# Patient Record
Sex: MALE | URBAN - METROPOLITAN AREA
[De-identification: names, ages, dates, MRNs, and addresses within clinical notes are randomized per-mention and may not be internally consistent; named-entity substitution may affect disease eponyms.]

---

## 2023-11-07 ENCOUNTER — OFFICE (OUTPATIENT)
Dept: URBAN - METROPOLITAN AREA CLINIC 26 | Facility: CLINIC | Age: 43
End: 2023-11-07

## 2023-11-07 VITALS
SYSTOLIC BLOOD PRESSURE: 144 MMHG | HEIGHT: 70 IN | TEMPERATURE: 98.6 F | WEIGHT: 175 LBS | HEART RATE: 70 BPM | DIASTOLIC BLOOD PRESSURE: 100 MMHG

## 2023-11-07 DIAGNOSIS — R19.4 CHANGE IN BOWEL HABIT: ICD-10-CM

## 2023-11-07 DIAGNOSIS — R13.10 DYSPHAGIA, UNSPECIFIED: ICD-10-CM

## 2023-11-07 DIAGNOSIS — Z80.0 FAMILY HISTORY OF MALIGNANT NEOPLASM OF DIGESTIVE ORGANS: ICD-10-CM

## 2023-11-07 DIAGNOSIS — K21.9 GASTRO-ESOPHAGEAL REFLUX DISEASE WITHOUT ESOPHAGITIS: ICD-10-CM

## 2023-11-07 DIAGNOSIS — R63.4 ABNORMAL WEIGHT LOSS: ICD-10-CM

## 2023-11-07 DIAGNOSIS — K62.5 HEMORRHAGE OF ANUS AND RECTUM: ICD-10-CM

## 2023-11-07 PROCEDURE — 99204 OFFICE O/P NEW MOD 45 MIN: CPT | Performed by: INTERNAL MEDICINE

## 2023-11-07 RX ORDER — PANTOPRAZOLE 20 MG/1
20 TABLET, DELAYED RELEASE ORAL
Qty: 90 | Refills: 0 | Status: ACTIVE
Start: 2023-11-07

## 2023-12-03 ENCOUNTER — ANCILLARY PROCEDURE (OUTPATIENT)
Dept: RADIOLOGY | Facility: CLINIC | Age: 43
End: 2023-12-03
Payer: COMMERCIAL

## 2023-12-03 DIAGNOSIS — S69.91XA HAND INJURY, RIGHT, INITIAL ENCOUNTER: ICD-10-CM

## 2023-12-03 PROCEDURE — 73130 X-RAY EXAM OF HAND: CPT | Mod: RIGHT SIDE | Performed by: RADIOLOGY

## 2023-12-03 PROCEDURE — 73130 X-RAY EXAM OF HAND: CPT | Mod: RT,FY,FR

## 2023-12-06 ENCOUNTER — OFFICE VISIT (OUTPATIENT)
Dept: ORTHOPEDIC SURGERY | Facility: CLINIC | Age: 43
End: 2023-12-06
Payer: COMMERCIAL

## 2023-12-06 VITALS — HEIGHT: 70 IN | BODY MASS INDEX: 25.2 KG/M2 | WEIGHT: 176 LBS

## 2023-12-06 DIAGNOSIS — S62.326A CLOSED DISPLACED FRACTURE OF SHAFT OF FIFTH METACARPAL BONE OF RIGHT HAND, INITIAL ENCOUNTER: Primary | ICD-10-CM

## 2023-12-06 PROCEDURE — 99203 OFFICE O/P NEW LOW 30 MIN: CPT | Performed by: ORTHOPAEDIC SURGERY

## 2023-12-06 RX ORDER — SODIUM CHLORIDE, SODIUM LACTATE, POTASSIUM CHLORIDE, CALCIUM CHLORIDE 600; 310; 30; 20 MG/100ML; MG/100ML; MG/100ML; MG/100ML
100 INJECTION, SOLUTION INTRAVENOUS CONTINUOUS
Status: CANCELLED | OUTPATIENT
Start: 2023-12-06

## 2023-12-06 RX ORDER — BUPIVACAINE HYDROCHLORIDE 5 MG/ML
10 INJECTION, SOLUTION EPIDURAL; INTRACAUDAL ONCE
Status: CANCELLED | OUTPATIENT
Start: 2023-12-06 | End: 2023-12-06

## 2023-12-06 NOTE — PROGRESS NOTES
Subjective    Patient ID: Waqas Steele is a 43 y.o. male.    Chief Complaint: OTHER (RT HAND PAIN SINCE 12/2/23./PATIENT PUNCHED A TREE.)     Last Surgery: No surgery found  Last Surgery Date: No surgery found    HPI  Patient is a 43-year-old right-hand-dominant male who comes in after sustaining an injury to his right hand 5 days ago.  The injury.  He punched a tree.  He had immediate pain and swelling.  He was seen at urgent care when he was diagnosed with 1/5 metacarpal shaft fracture.  It was displaced.  He was splinted and comes in today for first follow-up.  He denies any other trauma.  He denies any other numbness and paresthesias.    Objective   Ortho Exam  Patient is in no acute distress.  Exam of his right hand reveals the skin envelope is intact.  He is tender to palpation over the fifth metacarpal shaft.  He has function of his EDC, EDQ, FDS and FDP in his right little finger.  However range of motion is limited secondary to pain and deformity.    Image Results:  XR hand right 3+ views  Narrative: STUDY:  Hand Radiographs; 12/3/2023 4:37 PM  INDICATION:  Right hand injury.  COMPARISON:  None Available.  ACCESSION NUMBER(S):  [id number]  ORDERING CLINICIAN:  Ward Breen  TECHNIQUE:  Three view(s) of the right hand.  FINDINGS:    Mildly displaced angulated midshaft fifth metacarpal fracture with  apex dorsal angulation  Impression: Mildly displaced angulated midshaft fifth metacarpal fracture with  apex dorsal angulation.  Signed by David Alvarado MD      Assessment/Plan   Encounter Diagnoses:  Closed displaced fracture of shaft of fifth metacarpal bone of right hand, initial encounter    Orders Placed This Encounter    Basic Metabolic Panel    Case Request Operating Room: Open Reduction Internal Fixation Hand     Patient has a displaced right fifth metacarpal shaft fracture.  Given these findings I explained to the patient typically this is treated surgically.  I discussed with him in detail  the risk, benefits alternatives of a right fifth metacarpal open reduction internal fixation.  The patient voiced understanding and informed consent was obtained.  The patient will be scheduled for surgery within a week's time.

## 2023-12-07 RX ORDER — PROMETHAZINE HYDROCHLORIDE 25 MG/1
25 TABLET ORAL EVERY 6 HOURS PRN
Status: ON HOLD | COMMUNITY
Start: 2023-11-07 | End: 2023-12-08 | Stop reason: ALTCHOICE

## 2023-12-07 RX ORDER — METOPROLOL SUCCINATE 25 MG/1
25 TABLET, EXTENDED RELEASE ORAL
COMMUNITY
Start: 2023-10-25

## 2023-12-07 RX ORDER — PANTOPRAZOLE SODIUM 20 MG/1
20 TABLET, DELAYED RELEASE ORAL
COMMUNITY
Start: 2023-11-07

## 2023-12-07 RX ORDER — TADALAFIL 20 MG/1
20 TABLET ORAL DAILY PRN
COMMUNITY
Start: 2022-08-24

## 2023-12-07 RX ORDER — TRAZODONE HYDROCHLORIDE 50 MG/1
50 TABLET ORAL DAILY PRN
COMMUNITY
Start: 2023-10-25

## 2023-12-07 RX ORDER — SUMATRIPTAN 50 MG/1
50 TABLET, FILM COATED ORAL ONCE AS NEEDED
COMMUNITY

## 2023-12-07 NOTE — PREPROCEDURE INSTRUCTIONS
Current Medications   Medication Instructions    metoprolol succinate XL (Toprol-XL) 25 mg 24 hr tablet Take morning of surgery with sip of water, no other fluids    pantoprazole (ProtoNix) 20 mg EC tablet Continue until night before surgery    SUMAtriptan (Imitrex) 50 mg tablet Continue until night before surgery    tadalafil 20 mg tablet Stop 2 days before surgery    traZODone (Desyrel) 50 mg tablet Continue until night before surgery      NPO Instructions:  Additional Instructions:   Enter through main entrance of main hospital building, located at [address] [facility]. Proceed to registration, located in the back right hand corner. You will need your ID and insurance card for registration. Please ensure you have a responsible adult to drive you home.     Shower the morning of or night before your procedure. After you shower avoid lotions, powders, deodorants or anything applied to the skin. If you wear contacts or glasses, wear the glasses. If you do not have glasses, please bring a case for your contacts. You may wear hearing aids and dentures, bring a case for them or we will provide one. Make sure you wear something loose and comfortable. Keep in mind your surgery type and wear something that will accommodate incisions or bandages. Please remove all jewelry.     Nothing to eat or drink after midnight (including coffee, tea, gum etc). You may take medications discussed during phone call with a small sip of water.    For further questions Cristofer BARROS can be contacted at [phone] between 7AM-3PM.

## 2023-12-08 ENCOUNTER — HOSPITAL ENCOUNTER (OUTPATIENT)
Facility: HOSPITAL | Age: 43
Setting detail: OUTPATIENT SURGERY
Discharge: HOME | End: 2023-12-08
Attending: ORTHOPAEDIC SURGERY | Admitting: ORTHOPAEDIC SURGERY
Payer: COMMERCIAL

## 2023-12-08 ENCOUNTER — APPOINTMENT (OUTPATIENT)
Dept: RADIOLOGY | Facility: HOSPITAL | Age: 43
End: 2023-12-08
Payer: COMMERCIAL

## 2023-12-08 ENCOUNTER — ANESTHESIA EVENT (OUTPATIENT)
Dept: OPERATING ROOM | Facility: HOSPITAL | Age: 43
End: 2023-12-08
Payer: COMMERCIAL

## 2023-12-08 ENCOUNTER — ANESTHESIA (OUTPATIENT)
Dept: OPERATING ROOM | Facility: HOSPITAL | Age: 43
End: 2023-12-08
Payer: COMMERCIAL

## 2023-12-08 VITALS
SYSTOLIC BLOOD PRESSURE: 130 MMHG | RESPIRATION RATE: 18 BRPM | HEART RATE: 82 BPM | DIASTOLIC BLOOD PRESSURE: 80 MMHG | TEMPERATURE: 97.7 F | WEIGHT: 175.93 LBS | BODY MASS INDEX: 25.19 KG/M2 | OXYGEN SATURATION: 100 % | HEIGHT: 70 IN

## 2023-12-08 DIAGNOSIS — S62.326A CLOSED DISPLACED FRACTURE OF SHAFT OF FIFTH METACARPAL BONE OF RIGHT HAND, INITIAL ENCOUNTER: Primary | ICD-10-CM

## 2023-12-08 LAB
ANION GAP SERPL CALC-SCNC: 10 MMOL/L (ref 10–20)
BUN SERPL-MCNC: 13 MG/DL (ref 6–23)
CALCIUM SERPL-MCNC: 9.3 MG/DL (ref 8.6–10.3)
CHLORIDE SERPL-SCNC: 104 MMOL/L (ref 98–107)
CO2 SERPL-SCNC: 29 MMOL/L (ref 21–32)
CREAT SERPL-MCNC: 1.21 MG/DL (ref 0.5–1.3)
GFR SERPL CREATININE-BSD FRML MDRD: 77 ML/MIN/1.73M*2
GLUCOSE SERPL-MCNC: 86 MG/DL (ref 74–99)
HCT VFR BLD AUTO: 50.5 % (ref 41–52)
HGB BLD-MCNC: 17.6 G/DL (ref 13.5–17.5)
POTASSIUM SERPL-SCNC: 4.8 MMOL/L (ref 3.5–5.3)
SODIUM SERPL-SCNC: 138 MMOL/L (ref 136–145)

## 2023-12-08 PROCEDURE — 2500000004 HC RX 250 GENERAL PHARMACY W/ HCPCS (ALT 636 FOR OP/ED): Performed by: ANESTHESIOLOGY

## 2023-12-08 PROCEDURE — 7100000002 HC RECOVERY ROOM TIME - EACH INCREMENTAL 1 MINUTE: Performed by: ORTHOPAEDIC SURGERY

## 2023-12-08 PROCEDURE — A26615 PR OPEN TX METACARPAL FRACTURE SINGLE EA BONE: Performed by: NURSE ANESTHETIST, CERTIFIED REGISTERED

## 2023-12-08 PROCEDURE — 7100000010 HC PHASE TWO TIME - EACH INCREMENTAL 1 MINUTE: Performed by: ORTHOPAEDIC SURGERY

## 2023-12-08 PROCEDURE — 3700000001 HC GENERAL ANESTHESIA TIME - INITIAL BASE CHARGE: Performed by: ORTHOPAEDIC SURGERY

## 2023-12-08 PROCEDURE — 85014 HEMATOCRIT: CPT | Performed by: ANESTHESIOLOGY

## 2023-12-08 PROCEDURE — 2500000004 HC RX 250 GENERAL PHARMACY W/ HCPCS (ALT 636 FOR OP/ED): Performed by: NURSE ANESTHETIST, CERTIFIED REGISTERED

## 2023-12-08 PROCEDURE — 3600000008 HC OR TIME - EACH INCREMENTAL 1 MINUTE - PROCEDURE LEVEL THREE: Performed by: ORTHOPAEDIC SURGERY

## 2023-12-08 PROCEDURE — 2500000005 HC RX 250 GENERAL PHARMACY W/O HCPCS: Performed by: ORTHOPAEDIC SURGERY

## 2023-12-08 PROCEDURE — 3700000002 HC GENERAL ANESTHESIA TIME - EACH INCREMENTAL 1 MINUTE: Performed by: ORTHOPAEDIC SURGERY

## 2023-12-08 PROCEDURE — 2780000003 HC OR 278 NO HCPCS: Performed by: ORTHOPAEDIC SURGERY

## 2023-12-08 PROCEDURE — A4217 STERILE WATER/SALINE, 500 ML: HCPCS | Performed by: ORTHOPAEDIC SURGERY

## 2023-12-08 PROCEDURE — 2500000005 HC RX 250 GENERAL PHARMACY W/O HCPCS: Performed by: NURSE ANESTHETIST, CERTIFIED REGISTERED

## 2023-12-08 PROCEDURE — 7100000001 HC RECOVERY ROOM TIME - INITIAL BASE CHARGE: Performed by: ORTHOPAEDIC SURGERY

## 2023-12-08 PROCEDURE — 2500000004 HC RX 250 GENERAL PHARMACY W/ HCPCS (ALT 636 FOR OP/ED): Performed by: ORTHOPAEDIC SURGERY

## 2023-12-08 PROCEDURE — C1713 ANCHOR/SCREW BN/BN,TIS/BN: HCPCS | Performed by: ORTHOPAEDIC SURGERY

## 2023-12-08 PROCEDURE — 80048 BASIC METABOLIC PNL TOTAL CA: CPT | Performed by: ANESTHESIOLOGY

## 2023-12-08 PROCEDURE — 2720000007 HC OR 272 NO HCPCS: Performed by: ORTHOPAEDIC SURGERY

## 2023-12-08 PROCEDURE — A26615 PR OPEN TX METACARPAL FRACTURE SINGLE EA BONE: Performed by: ANESTHESIOLOGY

## 2023-12-08 PROCEDURE — 36415 COLL VENOUS BLD VENIPUNCTURE: CPT | Performed by: ANESTHESIOLOGY

## 2023-12-08 PROCEDURE — 76000 FLUOROSCOPY <1 HR PHYS/QHP: CPT

## 2023-12-08 PROCEDURE — 7100000009 HC PHASE TWO TIME - INITIAL BASE CHARGE: Performed by: ORTHOPAEDIC SURGERY

## 2023-12-08 PROCEDURE — 26615 TREAT METACARPAL FRACTURE: CPT | Performed by: ORTHOPAEDIC SURGERY

## 2023-12-08 PROCEDURE — 3600000003 HC OR TIME - INITIAL BASE CHARGE - PROCEDURE LEVEL THREE: Performed by: ORTHOPAEDIC SURGERY

## 2023-12-08 DEVICE — SCREW CORTEX 1.5 X 9MM, SELF TAP W/T4 STARDRIVE: Type: IMPLANTABLE DEVICE | Site: HAND | Status: FUNCTIONAL

## 2023-12-08 DEVICE — SCREW CORTEX 1.5 X 10, SELF TAP: Type: IMPLANTABLE DEVICE | Site: HAND | Status: FUNCTIONAL

## 2023-12-08 DEVICE — IMPLANTABLE DEVICE: Type: IMPLANTABLE DEVICE | Site: HAND | Status: FUNCTIONAL

## 2023-12-08 RX ORDER — ONDANSETRON HYDROCHLORIDE 2 MG/ML
INJECTION, SOLUTION INTRAVENOUS AS NEEDED
Status: DISCONTINUED | OUTPATIENT
Start: 2023-12-08 | End: 2023-12-08

## 2023-12-08 RX ORDER — HYDROMORPHONE HYDROCHLORIDE 1 MG/ML
INJECTION, SOLUTION INTRAMUSCULAR; INTRAVENOUS; SUBCUTANEOUS
Status: COMPLETED
Start: 2023-12-08 | End: 2023-12-08

## 2023-12-08 RX ORDER — ONDANSETRON HYDROCHLORIDE 2 MG/ML
4 INJECTION, SOLUTION INTRAVENOUS ONCE AS NEEDED
Status: DISCONTINUED | OUTPATIENT
Start: 2023-12-08 | End: 2023-12-08 | Stop reason: HOSPADM

## 2023-12-08 RX ORDER — MEPERIDINE HYDROCHLORIDE 25 MG/ML
INJECTION INTRAMUSCULAR; INTRAVENOUS; SUBCUTANEOUS AS NEEDED
Status: DISCONTINUED | OUTPATIENT
Start: 2023-12-08 | End: 2023-12-08

## 2023-12-08 RX ORDER — PROPOFOL 10 MG/ML
INJECTION, EMULSION INTRAVENOUS AS NEEDED
Status: DISCONTINUED | OUTPATIENT
Start: 2023-12-08 | End: 2023-12-08

## 2023-12-08 RX ORDER — FENTANYL CITRATE 50 UG/ML
25 INJECTION, SOLUTION INTRAMUSCULAR; INTRAVENOUS EVERY 5 MIN PRN
Status: DISCONTINUED | OUTPATIENT
Start: 2023-12-08 | End: 2023-12-08 | Stop reason: HOSPADM

## 2023-12-08 RX ORDER — SODIUM CHLORIDE 0.9 G/100ML
IRRIGANT IRRIGATION AS NEEDED
Status: DISCONTINUED | OUTPATIENT
Start: 2023-12-08 | End: 2023-12-08 | Stop reason: HOSPADM

## 2023-12-08 RX ORDER — LABETALOL HYDROCHLORIDE 5 MG/ML
5 INJECTION, SOLUTION INTRAVENOUS ONCE AS NEEDED
Status: DISCONTINUED | OUTPATIENT
Start: 2023-12-08 | End: 2023-12-08 | Stop reason: HOSPADM

## 2023-12-08 RX ORDER — HYDRALAZINE HYDROCHLORIDE 20 MG/ML
5 INJECTION INTRAMUSCULAR; INTRAVENOUS EVERY 30 MIN PRN
Status: DISCONTINUED | OUTPATIENT
Start: 2023-12-08 | End: 2023-12-08 | Stop reason: HOSPADM

## 2023-12-08 RX ORDER — MIDAZOLAM HYDROCHLORIDE 1 MG/ML
INJECTION, SOLUTION INTRAMUSCULAR; INTRAVENOUS AS NEEDED
Status: DISCONTINUED | OUTPATIENT
Start: 2023-12-08 | End: 2023-12-08

## 2023-12-08 RX ORDER — MEPERIDINE HYDROCHLORIDE 25 MG/ML
12.5 INJECTION INTRAMUSCULAR; INTRAVENOUS; SUBCUTANEOUS EVERY 10 MIN PRN
Status: DISCONTINUED | OUTPATIENT
Start: 2023-12-08 | End: 2023-12-08 | Stop reason: HOSPADM

## 2023-12-08 RX ORDER — SODIUM CHLORIDE, SODIUM LACTATE, POTASSIUM CHLORIDE, CALCIUM CHLORIDE 600; 310; 30; 20 MG/100ML; MG/100ML; MG/100ML; MG/100ML
100 INJECTION, SOLUTION INTRAVENOUS CONTINUOUS
Status: DISCONTINUED | OUTPATIENT
Start: 2023-12-08 | End: 2023-12-08 | Stop reason: HOSPADM

## 2023-12-08 RX ORDER — ALBUTEROL SULFATE 0.83 MG/ML
2.5 SOLUTION RESPIRATORY (INHALATION) ONCE AS NEEDED
Status: DISCONTINUED | OUTPATIENT
Start: 2023-12-08 | End: 2023-12-08 | Stop reason: HOSPADM

## 2023-12-08 RX ORDER — FENTANYL CITRATE 50 UG/ML
50 INJECTION, SOLUTION INTRAMUSCULAR; INTRAVENOUS EVERY 5 MIN PRN
Status: DISCONTINUED | OUTPATIENT
Start: 2023-12-08 | End: 2023-12-08 | Stop reason: HOSPADM

## 2023-12-08 RX ORDER — LIDOCAINE HYDROCHLORIDE 10 MG/ML
0.1 INJECTION INFILTRATION; PERINEURAL ONCE
Status: DISCONTINUED | OUTPATIENT
Start: 2023-12-08 | End: 2023-12-08 | Stop reason: HOSPADM

## 2023-12-08 RX ORDER — DEXAMETHASONE SODIUM PHOSPHATE 4 MG/ML
INJECTION, SOLUTION INTRA-ARTICULAR; INTRALESIONAL; INTRAMUSCULAR; INTRAVENOUS; SOFT TISSUE AS NEEDED
Status: DISCONTINUED | OUTPATIENT
Start: 2023-12-08 | End: 2023-12-08

## 2023-12-08 RX ORDER — OXYCODONE AND ACETAMINOPHEN 5; 325 MG/1; MG/1
1 TABLET ORAL EVERY 6 HOURS PRN
Qty: 28 TABLET | Refills: 0 | Status: SHIPPED | OUTPATIENT
Start: 2023-12-08 | End: 2023-12-15

## 2023-12-08 RX ORDER — BUPIVACAINE HYDROCHLORIDE 5 MG/ML
INJECTION, SOLUTION PERINEURAL AS NEEDED
Status: DISCONTINUED | OUTPATIENT
Start: 2023-12-08 | End: 2023-12-08 | Stop reason: HOSPADM

## 2023-12-08 RX ORDER — FENTANYL CITRATE 50 UG/ML
INJECTION, SOLUTION INTRAMUSCULAR; INTRAVENOUS AS NEEDED
Status: DISCONTINUED | OUTPATIENT
Start: 2023-12-08 | End: 2023-12-08

## 2023-12-08 RX ORDER — ACETAMINOPHEN 325 MG/1
650 TABLET ORAL EVERY 4 HOURS PRN
Status: DISCONTINUED | OUTPATIENT
Start: 2023-12-08 | End: 2023-12-08 | Stop reason: HOSPADM

## 2023-12-08 RX ORDER — HYDROMORPHONE HYDROCHLORIDE 1 MG/ML
INJECTION, SOLUTION INTRAMUSCULAR; INTRAVENOUS; SUBCUTANEOUS AS NEEDED
Status: DISCONTINUED | OUTPATIENT
Start: 2023-12-08 | End: 2023-12-08

## 2023-12-08 RX ORDER — CEFAZOLIN SODIUM 2 G/100ML
2 INJECTION, SOLUTION INTRAVENOUS EVERY 8 HOURS
Status: DISCONTINUED | OUTPATIENT
Start: 2023-12-08 | End: 2023-12-08 | Stop reason: HOSPADM

## 2023-12-08 RX ORDER — LIDOCAINE HCL/PF 100 MG/5ML
SYRINGE (ML) INTRAVENOUS AS NEEDED
Status: DISCONTINUED | OUTPATIENT
Start: 2023-12-08 | End: 2023-12-08

## 2023-12-08 RX ORDER — BUPIVACAINE HYDROCHLORIDE 5 MG/ML
10 INJECTION, SOLUTION EPIDURAL; INTRACAUDAL ONCE
Status: DISCONTINUED | OUTPATIENT
Start: 2023-12-08 | End: 2023-12-08 | Stop reason: HOSPADM

## 2023-12-08 RX ADMIN — SODIUM CHLORIDE 12 MCG: 9 INJECTION, SOLUTION INTRAVENOUS at 10:58

## 2023-12-08 RX ADMIN — FENTANYL CITRATE 50 MCG: 50 INJECTION INTRAMUSCULAR; INTRAVENOUS at 12:31

## 2023-12-08 RX ADMIN — FENTANYL CITRATE 50 MCG: 50 INJECTION, SOLUTION INTRAMUSCULAR; INTRAVENOUS at 10:49

## 2023-12-08 RX ADMIN — LIDOCAINE HYDROCHLORIDE 60 MG: 20 INJECTION INTRAVENOUS at 10:38

## 2023-12-08 RX ADMIN — CEFAZOLIN SODIUM 2 G: 2 INJECTION, SOLUTION INTRAVENOUS at 10:44

## 2023-12-08 RX ADMIN — DEXAMETHASONE SODIUM PHOSPHATE 4 MG: 4 INJECTION, SOLUTION INTRAMUSCULAR; INTRAVENOUS at 10:46

## 2023-12-08 RX ADMIN — MIDAZOLAM 2 MG: 1 INJECTION INTRAMUSCULAR; INTRAVENOUS at 10:33

## 2023-12-08 RX ADMIN — FENTANYL CITRATE 50 MCG: 50 INJECTION, SOLUTION INTRAMUSCULAR; INTRAVENOUS at 10:38

## 2023-12-08 RX ADMIN — SODIUM CHLORIDE, POTASSIUM CHLORIDE, SODIUM LACTATE AND CALCIUM CHLORIDE 100 ML/HR: 600; 310; 30; 20 INJECTION, SOLUTION INTRAVENOUS at 09:29

## 2023-12-08 RX ADMIN — PROPOFOL 200 MG: 10 INJECTION, EMULSION INTRAVENOUS at 10:38

## 2023-12-08 RX ADMIN — MEPERIDINE HYDROCHLORIDE 25 MG: 25 INJECTION INTRAMUSCULAR; INTRAVENOUS; SUBCUTANEOUS at 11:20

## 2023-12-08 RX ADMIN — ONDANSETRON 4 MG: 2 INJECTION INTRAMUSCULAR; INTRAVENOUS at 11:20

## 2023-12-08 RX ADMIN — SODIUM CHLORIDE, POTASSIUM CHLORIDE, SODIUM LACTATE AND CALCIUM CHLORIDE 100 ML/HR: 600; 310; 30; 20 INJECTION, SOLUTION INTRAVENOUS at 12:03

## 2023-12-08 RX ADMIN — SODIUM CHLORIDE 8 MCG: 9 INJECTION, SOLUTION INTRAVENOUS at 11:04

## 2023-12-08 RX ADMIN — HYDROMORPHONE HYDROCHLORIDE 1 MG: 1 INJECTION, SOLUTION INTRAMUSCULAR; INTRAVENOUS; SUBCUTANEOUS at 11:57

## 2023-12-08 RX ADMIN — FENTANYL CITRATE 50 MCG: 50 INJECTION INTRAMUSCULAR; INTRAVENOUS at 12:12

## 2023-12-08 RX ADMIN — SODIUM CHLORIDE, POTASSIUM CHLORIDE, SODIUM LACTATE AND CALCIUM CHLORIDE: 600; 310; 30; 20 INJECTION, SOLUTION INTRAVENOUS at 10:32

## 2023-12-08 SDOH — HEALTH STABILITY: MENTAL HEALTH: CURRENT SMOKER: 0

## 2023-12-08 ASSESSMENT — PAIN DESCRIPTION - ORIENTATION
ORIENTATION: RIGHT
ORIENTATION: RIGHT

## 2023-12-08 ASSESSMENT — PAIN SCALES - GENERAL
PAINLEVEL_OUTOF10: 0 - NO PAIN
PAINLEVEL_OUTOF10: 10 - WORST POSSIBLE PAIN
PAINLEVEL_OUTOF10: 7
PAINLEVEL_OUTOF10: 8

## 2023-12-08 ASSESSMENT — PAIN - FUNCTIONAL ASSESSMENT
PAIN_FUNCTIONAL_ASSESSMENT: 0-10
PAIN_FUNCTIONAL_ASSESSMENT: 0-10

## 2023-12-08 ASSESSMENT — COLUMBIA-SUICIDE SEVERITY RATING SCALE - C-SSRS
6. HAVE YOU EVER DONE ANYTHING, STARTED TO DO ANYTHING, OR PREPARED TO DO ANYTHING TO END YOUR LIFE?: NO
2. HAVE YOU ACTUALLY HAD ANY THOUGHTS OF KILLING YOURSELF?: NO
1. IN THE PAST MONTH, HAVE YOU WISHED YOU WERE DEAD OR WISHED YOU COULD GO TO SLEEP AND NOT WAKE UP?: NO
2. HAVE YOU ACTUALLY HAD ANY THOUGHTS OF KILLING YOURSELF?: NO
6. HAVE YOU EVER DONE ANYTHING, STARTED TO DO ANYTHING, OR PREPARED TO DO ANYTHING TO END YOUR LIFE?: NO
1. IN THE PAST MONTH, HAVE YOU WISHED YOU WERE DEAD OR WISHED YOU COULD GO TO SLEEP AND NOT WAKE UP?: NO

## 2023-12-08 ASSESSMENT — PAIN DESCRIPTION - LOCATION
LOCATION: HAND
LOCATION: HAND

## 2023-12-08 NOTE — H&P
History Of Present Illness  Waqas Steele is a 43 y.o. male presenting with right hand injury after punching a tree.  He had sustained a displaced right fifth metacarpal fracture..     Past Medical History  No past medical history on file.    Surgical History  No past surgical history on file.     Social History  He reports that he has never smoked. His smokeless tobacco use includes chew. He reports current alcohol use. He reports that he does not use drugs.    Family History  No family history on file.     Allergies  Celecoxib and Tramadol    Review of Systems   All other systems reviewed and are negative.       Physical Exam    Patient is in no acute distress.  The skin envelope on his right hand and wrist is intact.  He is tender to palpation over the fifth metacarpal shaft.  Range of motion in his right little finger is limited secondary to pain.  He otherwise had adequate range of motion in his other digits in his right hand.     Last Recorded Vitals  There were no vitals taken for this visit.    Relevant Results        Patient has a displaced right fifth metacarpal shaft fracture on plain x-rays.     Assessment/Plan   Principal Problem:    Closed displaced fracture of shaft of fifth metacarpal bone of right hand      Patient has a displaced right fifth metacarpal shaft fracture.  I explained to him this is typically treated surgically.  I discussed with him in detail the risk, benefits alternatives of a right fifth metacarpal open reduction total fixation.  The patient voiced understanding and informed consent was obtained.  The patient will undergo surgery for fracture fixation.       I spent 20 minutes in the professional and overall care of this patient.      Crispin Bentley MD

## 2023-12-08 NOTE — ANESTHESIA PREPROCEDURE EVALUATION
Patient: Waqas Stelee    Procedure Information       Date/Time: 12/08/23 1030    Procedure: OPEN REDUCTION INTERNAL FIXATION RIGHT 5th  METACARPAL FRACTURE WITH C-ARM (Right: Hand)    Location: [facility] OR 07 / [facility] OR    Surgeons: Crispin Bentley MD            Relevant Problems   No relevant active problems       Clinical information reviewed:    Allergies  Meds               NPO Detail:  NPO/Void Status  Date of Last Liquid: 12/07/23  Time of Last Liquid: 2300  Date of Last Solid: 12/07/23  Time of Last Solid: 1900         Physical Exam    Airway  Mallampati: II  TM distance: >3 FB  Neck ROM: full     Cardiovascular   Rhythm: regular  Rate: normal     Dental - normal exam     Pulmonary    Abdominal        Anesthesia Plan    ASA 2     general     The patient is not a current smoker.  Patient was not previously instructed to abstain from smoking on day of procedure.  Patient did not smoke on day of procedure.    intravenous induction   Postoperative administration of opioids is intended.  Anesthetic plan and risks discussed with patient.  Use of blood products discussed with patient who consented to blood products.    Plan discussed with CRNA.

## 2023-12-08 NOTE — OP NOTE
OPEN REDUCTION INTERNAL FIXATION RIGHT 5th  METACARPAL FRACTURE WITH C-ARM (R) Operative Note     Date: 2023  OR Location: [facility] OR    Name: Waqas Steele, : 1980, Age: 43 y.o., MRN: [id number], Sex: male    Diagnosis  Pre-op Diagnosis     * Closed displaced fracture of shaft of fifth metacarpal bone of right hand, initial encounter [X32.326A] Post-op Diagnosis     * Closed displaced fracture of shaft of fifth metacarpal bone of right hand, initial encounter [S62.326A]     Procedures  OPEN REDUCTION INTERNAL FIXATION RIGHT 5th  METACARPAL FRACTURE WITH C-ARM  22509 - [place] OPEN TX METACARPAL FRACTURE SINGLE EA BONE      Surgeons      * Crispin Bentley - Primary    Resident/Fellow/Other Assistant:  Surgeon(s) and Role:    Procedure Summary  Anesthesia: General  ASA: II  Anesthesia Staff: Anesthesiologist: Naveen Barry MD  CRNA: DAVID Garcia-CRNA  Estimated Blood Loss: 5 mL  Intra-op Medications:   Medication Name Total Dose   BUPivacaine HCl (Marcaine) 0.5 % (5 mg/mL) injection 10 mL   sodium chloride 0.9 % irrigation solution 1,000 mL   ceFAZolin in dextrose (iso-os) (Ancef) IVPB 2 g 2 g              Anesthesia Record               Intraprocedure I/O Totals          Intake    Dexmedetomidine 0.00 mL    The total shown is the total volume documented since Anesthesia Start was filed.    ceFAZolin in dextrose (iso-os) (Ancef) IVPB 2 g 100.00 mL    Total Intake 100 mL          Specimen: No specimens collected     Staff:   Circulator: Debbie Pritchard RN  Relief Circulator: Nnamdi Conley RN  Scrub Person: Robert Kruse         Drains and/or Catheters: * None in log *    Tourniquet Times:     Total Tourniquet Time Documented:  Arm - Lower (Right) - 34 minutes  Total: Arm - Lower (Right) - 34 minutes      Implants:  Implants       Type Name Action Serial No.       va lcp plate 1.5mm Implanted               Findings: Displaced transverse right fifth metacarpal shaft  fracture    Indications: Waqas Steele is an 43 y.o. male who is having surgery for Closed displaced fracture of shaft of fifth metacarpal bone of right hand, initial encounter [Y65.679I].  Patient had presented after he sustained an injury while punching a tree.  He had sustained a transverse displaced right fifth metacarpal shaft fracture.  Given the complete displacement I explained to the patient typically this would be treated surgically.  I discussed with him in detail the risk, benefits alternatives of a right fifth metacarpal open reduction internal fixation.  I explained to him the risks of the procedure include but are not limited to infection, damage to nerves and blood vessels, postoperative pain and stiffness, malunion, nonunion, hardware failure, as well as risks associate with anesthesia.  The patient voiced understanding and informed consent was obtained.    The patient was seen in the preoperative area. The risks, benefits, complications, treatment options, non-operative alternatives, expected recovery and outcomes were discussed with the patient. The possibilities of reaction to medication, pulmonary aspiration, injury to surrounding structures, bleeding, recurrent infection, the need for additional procedures, failure to diagnose a condition, and creating a complication requiring transfusion or operation were discussed with the patient. The patient concurred with the proposed plan, giving informed consent.  The site of surgery was properly noted/marked if necessary per policy. The patient has been actively warmed in preoperative area. Preoperative antibiotics have been ordered and given within 1 hours of incision. Venous thrombosis prophylaxis have been ordered including bilateral sequential compression devices    Procedure Details: The patient was prepped identified in the preoperative waiting area and his right hand was marked as site of surgery.  Ancef was administered intravenously.   Patient was taken back to the operating room suite placed supine on the OR table.  A nonsterile tourniquet was applied to the patient's right upper extremity.  After general anesthesia with LMA was administered patient's right upper extremity was prepped and draped in the usual sterile fashion.  A preoperative verification timeout was taken.  At this point the patient's right upper extremity was exsanguinated with an Esmarch bandage and the tourniquet inflated to 250 mmHg.  I then made approximately a 3 inch longitudinal incision overlying the fifth metacarpal dorsally.  Sharp dissection was carried through skin and subcutaneous tissue.  Electrocautery was used to achieve hemostasis.  Identified the extensor tendons to the right little finger and retracted ulnarly.  I opened up the soft tissue over the fifth metacarpal.  The fracture site was easily identifiable.  The distal fracture fragment was 100% displaced dorsally.  I cleaned off the soft tissue around the fracture edges.  I then used a longitudinal traction and a volarly directed force to obtain a reduction.  I secured my fracture fixation with a Synthes 6 hole 1.5 mm straight plate.  I placed combination of 1.5 mm cortical and locking screws.  3 screws were placed proximal and 3 screws were placed distal to the fracture line.  I used C-arm fluoroscopy in orthogonal views to confirm final fracture reduction and hardware placement.  I was satisfied with this.  Using passive tenodesis there was no malrotation noted with flexion or extension of the right little finger.  Wound was irrigated with normal saline.  I closed the fascia over the bone with 3-0 Vicryl.  Tourniquet was let down after 34 minutes.  Good vascular return was seen to the patient's right hand and all digits.  A sterile dressing consisting of Xeroform, gauze 4 x 4's and Webril was applied to the patient's right hand.  Patient was placed in an ulnar gutter splint.  Patient was awakened from  anesthesia and returned to recovery room in stable condition.  There were no complications in the case.  All sponge and needle counts were correct at the end of the case.  Complications:  None; patient tolerated the procedure well.    Disposition: PACU - hemodynamically stable.  Condition: stable         Additional Details: None    Attending Attestation: I performed the procedure.    Crispin Bentley  Phone Number: [phone]

## 2023-12-08 NOTE — ANESTHESIA PROCEDURE NOTES
Airway  Date/Time: 12/8/2023 10:41 AM    Staffing  Performed: CRNA   Authorized by: Naveen Barry MD    Performed by: DAVID Garcia-BENNETT  Patient location during procedure: OR    Indications and Patient Condition  Indications for airway management: anesthesia and airway protection  Spontaneous ventilation: present  Sedation level: deep  Preoxygenated: yes  Patient position: sniffing  MILS maintained throughout  Mask difficulty assessment: 0 - not attempted  Planned trial extubation    Final Airway Details  Final airway type: supraglottic airway      Successful airway: unique  Size 4     Number of attempts at approach: 1

## 2023-12-08 NOTE — ANESTHESIA POSTPROCEDURE EVALUATION
Patient: Waqas Steele    Procedure Summary       Date: 12/08/23 Room / Location: [facility] OR 07 / [facility]    Anesthesia Start: 1033 Anesthesia Stop:     Procedure: OPEN REDUCTION INTERNAL FIXATION RIGHT 5th  METACARPAL FRACTURE WITH C-ARM (Right: Hand) Diagnosis:       Closed displaced fracture of shaft of fifth metacarpal bone of right hand, initial encounter      (Closed displaced fracture of shaft of fifth metacarpal bone of right hand, initial encounter [S62.326A])    Surgeons: Crispin Bentley MD Responsible Provider: Naveen Barry MD    Anesthesia Type: general ASA Status: 2            Anesthesia Type: general    Vitals Value Taken Time   /75 12/08/23 1153   Temp 36.7 12/08/23 1153   Pulse 80 12/08/23 1153   Resp 20 12/08/23 1153   SpO2 100% 12/08/23 1153       Anesthesia Post Evaluation    Patient location during evaluation: PACU  Patient participation: complete - patient participated  Level of consciousness: awake and alert  Pain management: inadequate  Airway patency: patent  Cardiovascular status: acceptable  Respiratory status: acceptable and face mask  Hydration status: acceptable  Postoperative Nausea and Vomiting: none        No notable events documented.

## 2023-12-08 NOTE — BRIEF OP NOTE
Date: 2023  OR Location: [facility] OR    Name: Waqas Steele, : 1980, Age: 43 y.o., MRN: [id number], Sex: male    Diagnosis  Pre-op Diagnosis     * Closed displaced fracture of shaft of fifth metacarpal bone of right hand, initial encounter [N08.071L] Post-op Diagnosis     * Closed displaced fracture of shaft of fifth metacarpal bone of right hand, initial encounter [X40.118S]     Procedures  OPEN REDUCTION INTERNAL FIXATION RIGHT 5th  METACARPAL FRACTURE WITH C-ARM  56946 - NE OPEN TX METACARPAL FRACTURE SINGLE EA BONE      Surgeons      * Crispin Bentley - Primary    Resident/Fellow/Other Assistant:  Surgeon(s) and Role:    Procedure Summary  Anesthesia: General  ASA: II  Anesthesia Staff: Anesthesiologist: Naveen Barry MD  CRNA: DAVID Garcia-CRNA  Estimated Blood Loss: 5 mL  Intra-op Medications:   Medication Name Total Dose   BUPivacaine HCl (Marcaine) 0.5 % (5 mg/mL) injection 10 mL   sodium chloride 0.9 % irrigation solution 1,000 mL   ceFAZolin in dextrose (iso-os) (Ancef) IVPB 2 g 2 g              Anesthesia Record               Intraprocedure I/O Totals          Intake    Dexmedetomidine 0.00 mL    The total shown is the total volume documented since Anesthesia Start was filed.    ceFAZolin in dextrose (iso-os) (Ancef) IVPB 2 g 100.00 mL    Total Intake 100 mL          Specimen: No specimens collected     Staff:   Circulator: Debbie Pritchard RN  Relief Circulator: Nnamdi Conley RN  Scrub Person: Robert Kruse          Findings: Displaced transverse right fifth metacarpal fracture    Complications:  None; patient tolerated the procedure well.     Disposition: PACU - hemodynamically stable.  Condition: stable  Specimens Collected: No specimens collected  Attending Attestation: I performed the procedure.    Crispin Bentley  Phone Number: [phone]

## 2023-12-20 ENCOUNTER — ANCILLARY PROCEDURE (OUTPATIENT)
Dept: RADIOLOGY | Facility: CLINIC | Age: 43
End: 2023-12-20
Payer: COMMERCIAL

## 2023-12-20 DIAGNOSIS — S62.326D CLOSED DISPLACED FRACTURE OF SHAFT OF FIFTH METACARPAL BONE OF RIGHT HAND WITH ROUTINE HEALING, SUBSEQUENT ENCOUNTER: ICD-10-CM

## 2023-12-21 ENCOUNTER — ANCILLARY PROCEDURE (OUTPATIENT)
Dept: RADIOLOGY | Facility: CLINIC | Age: 43
End: 2023-12-21
Payer: COMMERCIAL

## 2023-12-21 ENCOUNTER — OFFICE VISIT (OUTPATIENT)
Dept: ORTHOPEDIC SURGERY | Facility: CLINIC | Age: 43
End: 2023-12-21
Payer: COMMERCIAL

## 2023-12-21 DIAGNOSIS — S62.326D CLOSED DISPLACED FRACTURE OF SHAFT OF FIFTH METACARPAL BONE OF RIGHT HAND WITH ROUTINE HEALING, SUBSEQUENT ENCOUNTER: Primary | ICD-10-CM

## 2023-12-21 PROCEDURE — 73130 X-RAY EXAM OF HAND: CPT | Mod: RT

## 2023-12-21 PROCEDURE — 73130 X-RAY EXAM OF HAND: CPT | Mod: RIGHT SIDE | Performed by: RADIOLOGY

## 2023-12-21 PROCEDURE — 99024 POSTOP FOLLOW-UP VISIT: CPT | Performed by: ORTHOPAEDIC SURGERY

## 2023-12-21 RX ORDER — OXYCODONE AND ACETAMINOPHEN 5; 325 MG/1; MG/1
1 TABLET ORAL EVERY 6 HOURS PRN
Qty: 28 TABLET | Refills: 0 | Status: SHIPPED | OUTPATIENT
Start: 2023-12-21 | End: 2023-12-28

## 2023-12-21 NOTE — PROGRESS NOTES
Subjective    Patient ID: Waqas Steele is a 43 y.o. male.    Chief Complaint: Post-op of the Right Hand (POV RIGHT 5TH MC ORIF DOS 12/8/23)     Last Surgery: OPEN REDUCTION INTERNAL FIXATION RIGHT 5th  METACARPAL FRACTURE WITH C-ARM - Right  Last Surgery Date: 12/8/2023    HPI  Patient comes in first postoperative visit after undergoing a right fifth metacarpal ORIF.  He is about 10 weeks out from surgery.  He has still mild to moderate pain at the surgical site.    Objective   Ortho Exam  Patient is in no acute distress.  Exam of his right hand reveals his incision is healed well.  There is no evidence of infection.  Sutures removed.  He easily obtains full extension in the right little finger.  Flexion is limited as he has been splinted.  With the limited amount of flexion there was no malrotation noted.  He still has mild tenderness directly over the fracture site.    Image Results:  X-rays of his right hand were personally reviewed.  They show adequate alignment at the fracture site.  The hardware is intact.    Assessment/Plan   Encounter Diagnoses:  Closed displaced fracture of shaft of fifth metacarpal bone of right hand with routine healing, subsequent encounter    Orders Placed This Encounter    Wrist splint    XR hand right 3+ views     Patient is doing satisfactory following his right fifth metacarpal ORIF.  He will go into a fracture brace.  He is allowed to begin active and passive range of motion in his right hand.  He will follow-up in 4 weeks with x-rays of his right hand.   Telephone Encounter by Indiana Bach at 05/10/17 02:46 PM     Author:  Indiana Bach Service:  (none) Author Type:  Patient      Filed:  05/10/17 02:46 PM Encounter Date:  5/5/2017 Status:  Signed     :  Indiana Bach (Patient )            Pt returned call. Please call back when available.[SW1.1M]       Revision History        User Key Date/Time User Provider Type Action    > SW1.1 05/10/17 02:46 PM Indiana Bach Patient  Sign    M - Manual

## 2024-01-25 ENCOUNTER — OFFICE VISIT (OUTPATIENT)
Dept: ORTHOPEDIC SURGERY | Facility: CLINIC | Age: 44
End: 2024-01-25
Payer: COMMERCIAL

## 2024-01-25 ENCOUNTER — HOSPITAL ENCOUNTER (OUTPATIENT)
Dept: RADIOLOGY | Facility: CLINIC | Age: 44
Discharge: HOME | End: 2024-01-25
Payer: COMMERCIAL

## 2024-01-25 DIAGNOSIS — S62.326D CLOSED DISPLACED FRACTURE OF SHAFT OF FIFTH METACARPAL BONE OF RIGHT HAND WITH ROUTINE HEALING, SUBSEQUENT ENCOUNTER: Primary | ICD-10-CM

## 2024-01-25 DIAGNOSIS — S62.326D CLOSED DISPLACED FRACTURE OF SHAFT OF FIFTH METACARPAL BONE OF RIGHT HAND WITH ROUTINE HEALING, SUBSEQUENT ENCOUNTER: ICD-10-CM

## 2024-01-25 PROCEDURE — 73130 X-RAY EXAM OF HAND: CPT | Mod: RIGHT SIDE | Performed by: RADIOLOGY

## 2024-01-25 PROCEDURE — 99024 POSTOP FOLLOW-UP VISIT: CPT | Performed by: ORTHOPAEDIC SURGERY

## 2024-01-25 PROCEDURE — 73130 X-RAY EXAM OF HAND: CPT | Mod: RT

## 2024-01-25 NOTE — PROGRESS NOTES
Subjective    Patient ID: Waqas Steele is a 43 y.o. male.    Chief Complaint: Follow-up of the Right Hand (FUV R 5TH METACARPAL SHAFT FX)     Last Surgery: OPEN REDUCTION INTERNAL FIXATION RIGHT 5th  METACARPAL FRACTURE WITH C-ARM - Right  Last Surgery Date: 12/8/2023    HPI  Patient comes in for follow-up of his right fifth metacarpal ORIF.  He is about 6 weeks out from surgery.  He has regained range of motion in his right little finger since his last visit.    Objective   Ortho Exam  Patient is in no acute distress.  Exam of his right hand and wrist reveals his incision is well-healed.  There is no evidence of infection.  He has just mild tenderness to palpation over the fifth metacarpal.  He has full range of motion in his right little finger.  There is no malrotation.    Image Results:  X-rays of his right hand were personally reviewed today.  He has maintained adequate alignment the fracture site.  The hardware is intact.  There is evidence of callus bridging the fracture site.    Assessment/Plan   Encounter Diagnoses:  Closed displaced fracture of shaft of fifth metacarpal bone of right hand with routine healing, subsequent encounter    Orders Placed This Encounter    XR hand right 3+ views     Patient has a nearly healed right fifth metacarpal fracture, status post ORIF.  He will continue to work on his range of motion and strengthening.  He may discontinue his brace.  He will follow-up in 3 to 4 weeks with x-rays of his right hand.

## 2024-02-15 ENCOUNTER — OFFICE VISIT (OUTPATIENT)
Dept: ORTHOPEDIC SURGERY | Facility: CLINIC | Age: 44
End: 2024-02-15
Payer: COMMERCIAL

## 2024-02-15 ENCOUNTER — HOSPITAL ENCOUNTER (OUTPATIENT)
Dept: RADIOLOGY | Facility: CLINIC | Age: 44
Discharge: HOME | End: 2024-02-15
Payer: COMMERCIAL

## 2024-02-15 DIAGNOSIS — S62.326D CLOSED DISPLACED FRACTURE OF SHAFT OF FIFTH METACARPAL BONE OF RIGHT HAND WITH ROUTINE HEALING, SUBSEQUENT ENCOUNTER: ICD-10-CM

## 2024-02-15 DIAGNOSIS — S62.326D CLOSED DISPLACED FRACTURE OF SHAFT OF FIFTH METACARPAL BONE OF RIGHT HAND WITH ROUTINE HEALING, SUBSEQUENT ENCOUNTER: Primary | ICD-10-CM

## 2024-02-15 PROCEDURE — 73130 X-RAY EXAM OF HAND: CPT | Mod: RIGHT SIDE | Performed by: RADIOLOGY

## 2024-02-15 PROCEDURE — 73130 X-RAY EXAM OF HAND: CPT | Mod: RT

## 2024-02-15 PROCEDURE — 99024 POSTOP FOLLOW-UP VISIT: CPT | Performed by: ORTHOPAEDIC SURGERY

## 2024-02-15 NOTE — PROGRESS NOTES
Subjective    Patient ID: Waqas Steele is a 43 y.o. male.    Chief Complaint: Follow-up of the Right Hand (FUV R 5TH METACARPAL SHAFT FX )     Last Surgery: OPEN REDUCTION INTERNAL FIXATION RIGHT 5th  METACARPAL FRACTURE WITH C-ARM - Right  Last Surgery Date: 12/8/2023    HPI  Patient comes in for the 10-week follow-up after his right fifth metacarpal ORIF.  He states that the swelling he was concerned about at the last visit has resolved.  He no longer complains of any pain.  He has began using his right hand without any restrictions for typical activities of daily living.    Objective   Ortho Exam  Patient is in no acute distress.  Exam of his right hand reveals his incision is well-healed.  He has no tenderness at the fracture site.  He has full range of motion in his right little finger.  There is no malrotation.    Image Results: X-rays of his right hand were personally reviewed today.  They show that he has a healed fifth metacarpal shaft fracture in adequate alignment.  There is abundant callus formation.  The hardware is intact.    Assessment/Plan   Encounter Diagnoses:  Closed displaced fracture of shaft of fifth metacarpal bone of right hand with routine healing, subsequent encounter    Orders Placed This Encounter    XR hand right 3+ views     Patient has a healed right fifth metacarpal neck fracture.  He may use his right hand is much as he can tolerate.  He may weight-bear as much as he can tolerate as well.  He will follow-up as his symptoms dictate.

## (undated) DEVICE — Device

## (undated) DEVICE — DRILL BIT, 1.1MM, 70/39MM, FOR PILOT HOLE

## (undated) DEVICE — DRESSING, GAUZE, PETROLATUM, PATCH, XEROFORM, 1 X 8 IN, STERILE

## (undated) DEVICE — IMPLANTABLE DEVICE: Type: IMPLANTABLE DEVICE | Status: NON-FUNCTIONAL

## (undated) DEVICE — BANDAGE, ELASTIC, PREMIUM, SELF-CLOSE, 3 IN X 5 YD, STERILE

## (undated) DEVICE — PADDING, CAST, WYTEX, 4 IN X 4 YD, LF

## (undated) DEVICE — PADDING, UNDERCAST, WYTEX, 2 IN X 4 YD, STERILE

## (undated) DEVICE — BAG, BANDED, 36IN X 28IN

## (undated) DEVICE — CUFF, TOURNIQUET, 18 X 3, DUAL PORT/SNGL BLADDER, DISP, LF

## (undated) DEVICE — SLEEVE, VASO PRESS, CALF GARMENT, MEDIUM, GREEN

## (undated) DEVICE — BANDAGE, ELASTIC, PREMIUM, SELF-CLOSE, 4 IN X 5.5 YD, STERILE